# Patient Record
Sex: FEMALE | Race: WHITE | URBAN - NONMETROPOLITAN AREA
[De-identification: names, ages, dates, MRNs, and addresses within clinical notes are randomized per-mention and may not be internally consistent; named-entity substitution may affect disease eponyms.]

---

## 2020-05-15 ENCOUNTER — OFFICE VISIT (OUTPATIENT)
Dept: SURGERY | Facility: CLINIC | Age: 55
End: 2020-05-15
Payer: COMMERCIAL

## 2020-05-15 VITALS — WEIGHT: 116 LBS | TEMPERATURE: 99 F | HEIGHT: 68 IN | BODY MASS INDEX: 17.58 KG/M2

## 2020-05-15 DIAGNOSIS — K64.2 GRADE III HEMORRHOIDS: Primary | ICD-10-CM

## 2020-05-15 PROCEDURE — 99243 OFF/OP CNSLTJ NEW/EST LOW 30: CPT | Performed by: SURGERY

## 2020-05-15 PROCEDURE — 3008F BODY MASS INDEX DOCD: CPT | Performed by: SURGERY

## 2020-05-15 RX ORDER — ALPRAZOLAM 1 MG/1
1 TABLET ORAL
COMMUNITY
Start: 2020-05-04

## 2020-05-20 NOTE — H&P
Rick Naik is a 48year old female  Patient presents with:  Hemorrhoids: Patient here for hemorrhoid consult. referred by Dr. Mely Pleitez. c/o rectal bleeding and pain/itching/pressure. colonoscopy done 2020.       REFERRED BY    Patient presents with perian extremities  HEMATOLOGY: denies hx anemia; denies bruising or excessive bleeding  Endocrine: no weight gain or loss no hot or cold intolerance    EXAM     Temperature 98.7 °F (37.1 °C), temperature source Oral, height 68\", weight 116 lb (52.6 kg).   GENERA

## 2020-06-02 ENCOUNTER — TELEPHONE (OUTPATIENT)
Dept: SURGERY | Facility: CLINIC | Age: 55
End: 2020-06-02

## 2020-06-02 DIAGNOSIS — Z01.818 PRE-OP TESTING: Primary | ICD-10-CM

## 2020-06-12 ENCOUNTER — TELEPHONE (OUTPATIENT)
Dept: SURGERY | Facility: CLINIC | Age: 55
End: 2020-06-12

## 2020-06-16 ENCOUNTER — LAB ENCOUNTER (OUTPATIENT)
Dept: LAB | Facility: HOSPITAL | Age: 55
End: 2020-06-16
Attending: SURGERY
Payer: COMMERCIAL

## 2020-06-16 DIAGNOSIS — Z01.818 PRE-OP TESTING: ICD-10-CM

## 2020-07-07 ENCOUNTER — OFFICE VISIT (OUTPATIENT)
Dept: SURGERY | Facility: CLINIC | Age: 55
End: 2020-07-07

## 2020-07-07 VITALS — BODY MASS INDEX: 18.19 KG/M2 | TEMPERATURE: 100 F | HEIGHT: 68 IN | WEIGHT: 120 LBS

## 2020-07-07 DIAGNOSIS — K64.2 GRADE III HEMORRHOIDS: Primary | ICD-10-CM

## 2020-07-07 PROCEDURE — 99024 POSTOP FOLLOW-UP VISIT: CPT | Performed by: SURGERY

## 2020-07-09 ENCOUNTER — MED REC SCAN ONLY (OUTPATIENT)
Dept: SURGERY | Facility: CLINIC | Age: 55
End: 2020-07-09

## 2021-08-17 ENCOUNTER — OFFICE VISIT (OUTPATIENT)
Dept: SURGERY | Facility: CLINIC | Age: 56
End: 2021-08-17
Payer: COMMERCIAL

## 2021-08-17 VITALS — HEART RATE: 61 BPM | WEIGHT: 120 LBS | TEMPERATURE: 99 F | HEIGHT: 68 IN | BODY MASS INDEX: 18.19 KG/M2

## 2021-08-17 DIAGNOSIS — K52.9 COLITIS: Primary | ICD-10-CM

## 2021-08-17 PROCEDURE — 3008F BODY MASS INDEX DOCD: CPT | Performed by: SURGERY

## 2021-08-17 PROCEDURE — 99214 OFFICE O/P EST MOD 30 MIN: CPT | Performed by: SURGERY

## 2021-08-17 NOTE — H&P
Doretha Amador is a 50year old female  Patient presents with:  Abdominal Pain: C/O LOWER LEFT ABDOMINAL PAIN FOR ABOUT A YEAR. CT ABD/PELVIS DONE 7/19 VW. REFERRED BY    Patient presents with complaint of left lower quadrant abdominal pain.  Patient s urine  MUSCULOSKELETAL: no joint complaints upper or lower extremities  HEMATOLOGY: denies hx anemia; denies bruising or excessive bleeding  Endocrine: no weight gain or loss no hot or cold intolerance    EXAM     Pulse 61, temperature 99.3 °F (37.4 °C), t information and to access the applicable information sheets.     If the result is Not Detected, this does not rule out the presence   of PCR inhibitors in the patient specimen or assay specific   nucleic acid in concentrations below the level of detection